# Patient Record
Sex: FEMALE | Race: WHITE | Employment: STUDENT | URBAN - METROPOLITAN AREA
[De-identification: names, ages, dates, MRNs, and addresses within clinical notes are randomized per-mention and may not be internally consistent; named-entity substitution may affect disease eponyms.]

---

## 2020-06-18 ENCOUNTER — VIRTUAL VISIT (OUTPATIENT)
Dept: PHYSICAL THERAPY | Facility: CLINIC | Age: 20
End: 2020-06-18
Payer: COMMERCIAL

## 2020-06-18 DIAGNOSIS — M25.562 LEFT KNEE PAIN, UNSPECIFIED CHRONICITY: ICD-10-CM

## 2020-06-18 PROCEDURE — 97110 THERAPEUTIC EXERCISES: CPT | Mod: GT | Performed by: PHYSICAL THERAPIST

## 2020-06-18 PROCEDURE — 97161 PT EVAL LOW COMPLEX 20 MIN: CPT | Mod: GT | Performed by: PHYSICAL THERAPIST

## 2020-06-18 NOTE — PROGRESS NOTES
"Physical Therapy Virtual Initial Visit      The patient has been notified of following:     \"This virtual visit will be conducted between you and your provider. We have found that certain health care needs can be provided without the need for physical presence.  This service lets us provide the care you need with a virtual visit.\"    Due to external, as well as internal [facility] management of the COVID-19 Virus, Trina Clay was not seen in our clinic.  As a substitution, we implemented a virtual visit to manage this patient's condition utilizing the [employer] virtual visit platform via the patient s existing code.  The provider, Leena Kaiser, reviewed the patient's chart, PTRx prescription, and spoke with the patient to determine the following telemedicine visit is appropriate and effective for the patient's care.    The following type of visit was completed:   Video Visit:  The [employer] platform uses a synchronous HIPAA compliant video stream for this patient encounter.         Subjective:  The history is provided by the patient. No  was used.   Patient Health History  Trina Clay being seen for left tibia fracture.     Problem began: 5/31/2020.   Problem occurred: from a fall.  Patient describes carrying one of her friends on her back when she started to fall forward.  To brace herself, she planted her left foot out in front of her and subsequently hyperextended her knee.  Patient has been wearing a hinged brace since her injury and remains NWB'ing on 2 crutches.  She has been advised by MD to remain NWB'ing for another 4 weeks .       General health as reported by patient is excellent.  Pertinent medical history includes: none.   Red flags:  None as reported by patient.  Medical allergies: none.   Surgeries include:  None.    Current medications:  None.    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                     Therapist Generated " HPI Evaluation         Type of problem:  Left knee.    This is a new condition.  Condition occurred with:  A fall/slip.    Patient reports pain:  Anterior and medial (proximal left tibia).        Associated symptoms:  Loss of motion/stiffness, edema and loss of strength. Symptoms are exacerbated by weight bearing  and relieved by bracing/immobilizing.          MD referral (patient has in her possession; advised to bring to next appt):  Evaluate and Rx 1-2x/week for 6 weeks with goals of increasing ROM, increasing strength, proprioception, and pain control.              Patient's Goals/Expectations:  To recover ROM and get stronger  Objective:    System                                              Knee Evaluation:  ROM:    AROM    Hyperextension: Left:  WNL    Right:  Extension: Left: WNL    Right:   Flexion: Left: Mild loss   Right:        Strength:         Quad Set Left:  Good    Pain: -           Edema:  Edema of the knee: mild effusion present L knee.          General   ROS    PTRx Content from today's visit:  Exercise Name: Supine Heel Slides - Reps: 20x - Sessions: 2-3  Exercise Name: Isometric Quad - Reps: 10x with 5-10 sec  hold (WITHOUT TOWEL ROLL under knee) - Sessions: 3  Exercise Name: Hip Flexion Straight Leg Raise, Sets: 3 - Reps: 10x - Sessions: 1  Exercise Name: Hip Extension Straight Leg Raise, Sets: 3 - Reps: 10x - Sessions: 1    Assessment/Plan:     Patient is a 19 year old female with left knee complaints.    Patient has the following significant findings with corresponding treatment plan.                Diagnosis 1:  Proximal L Tibia Fracture  Pain -  self management, education and home program  Decreased ROM/flexibility - therapeutic exercise  Decreased strength - therapeutic exercise and therapeutic activities  Edema - cold therapy and self management/home program  Impaired gait - gait training and assistive devices  Decreased function - therapeutic activities and home program    Cumulative  Therapy Evaluation is: Low complexity.    Previous and current functional limitations:  (See Goal Flow Sheet for this information)    Short term and Long term goals: (See Goal Flow Sheet for this information)     Communication ability:  Patient appears to be able to clearly communicate and understand verbal and written communication and follow directions correctly.  Treatment Explanation - The following has been discussed with the patient:  RX ordered/plan of care  Anticipated outcomes  Possible risks and side effects  This patient would benefit from PT intervention to resume normal activities.   Rehab potential is good.    Frequency:  1 X week, once daily  Duration:  for 12 weeks  Discharge Plan:  Achieve all LTG.  Independent in home treatment program.  Reach maximal therapeutic benefit.    Please refer to the daily flowsheet for treatment today, total treatment time and time spent performing 1:1 timed codes.       Virtual visit contact time    Time of service began: 10:40 AM  Time of service ended: 11:14 AM  Total Time for set up, visit, and documentation: 50 minutes    Payor: [employer] / Plan: [employer] / Product Type: HMO /     Procedure Code/s   Therapeutic Exercise (90408): 10 minutes    I have reviewed the note as documented above.  This accurately captures the substance of my conversation with the patient.  Provider location: [facility] [place] ([facility]/State)  Patient location: Home    ___________________________________________________

## 2020-06-18 NOTE — LETTER
"[facility]  [address]  [address]  [place] [address]  [phone]    2020    Re: Trina Clay   :   2000  MRN:  [id number]   REFERRING PHYSICIAN:   Jovani VUM [place] [facility]    Date of Initial Evaluation:  20  Visits:  Rxs Used: 1  Reason for Referral:  Left knee pain, unspecified chronicity    EVALUATION SUMMARY    Physical Therapy Virtual Initial Visit      The patient has been notified of following:     \"This virtual visit will be conducted between you and your provider. We have found that certain health care needs can be provided without the need for physical presence.  This service lets us provide the care you need with a virtual visit.\"    Due to external, as well as internal [facility] management of the COVID-19 Virus, Trina Clay was not seen in our clinic.  As a substitution, we implemented a virtual visit to manage this patient's condition utilizing the [employer] virtual visit platform via the patient s existing code.  The provider, Leena Kaiser, reviewed the patient's chart, PTRx prescription, and spoke with the patient to determine the following telemedicine visit is appropriate and effective for the patient's care.    The following type of visit was completed:   Video Visit:  The [employer] platform uses a synchronous HIPAA compliant video stream for this patient encounter.         Subjective:  The history is provided by the patient. No  was used.   Patient Health History  Trina Clay being seen for left tibia fracture.     Problem began: 2020.   Problem occurred: from a fall.  Patient describes carrying one of her friends on her back when she started to fall forward.  To brace herself, she planted her left foot out in front of her and subsequently hyperextended her knee.  Patient has been wearing a hinged brace since her injury and remains NWB'ing on 2 crutches.  She has been advised by MD to " remain NWB'ing for another 4 weeks .       [place] [facility]   :   2000        General health as reported by patient is excellent.  Pertinent medical history includes: none.   Red flags:  None as reported by patient.  Medical allergies: none.   Surgeries include:  None.    Current medications:  None.    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                     Therapist Generated HPI Evaluation         Type of problem:  Left knee.  This is a new condition.  Condition occurred with:  A fall/slip.  Patient reports pain:  Anterior and medial (proximal left tibia).  Associated symptoms:  Loss of motion/stiffness, edema and loss of strength. Symptoms are exacerbated by weight bearing  and relieved by bracing/immobilizing.  MD referral (patient has in her possession; advised to bring to next appt):  Evaluate and Rx 1-2x/week for 6 weeks with goals of increasing ROM, increasing strength, proprioception, and pain control.             Patient's Goals/Expectations:  To recover ROM and get stronger  Objective:  System       Knee Evaluation:  ROM:    AROM    Hyperextension: Left:  WNL    Right:  Extension: Left: WNL    Right:   Flexion: Left: Mild loss   Right:        Strength:         Quad Set Left:  Good    Pain: -     Edema:  Edema of the knee: mild effusion present L knee.          [facility]   :   2000          PTRx Content from today's visit:  Exercise Name: Supine Heel Slides - Reps: 20x - Sessions: 2-3  Exercise Name: Isometric Quad - Reps: 10x with 5-10 sec  hold (WITHOUT TOWEL ROLL under knee) - Sessions: 3  Exercise Name: Hip Flexion Straight Leg Raise, Sets: 3 - Reps: 10x - Sessions: 1  Exercise Name: Hip Extension Straight Leg Raise, Sets: 3 - Reps: 10x - Sessions: 1    Assessment/Plan:     Patient is a 19 year old female with left knee complaints.    Patient has the following significant findings with corresponding treatment plan.                 Diagnosis 1:  Proximal L Tibia Fracture  Pain -  self management, education and home program  Decreased ROM/flexibility - therapeutic exercise  Decreased strength - therapeutic exercise and therapeutic activities  Edema - cold therapy and self management/home program  Impaired gait - gait training and assistive devices  Decreased function - therapeutic activities and home program    Cumulative Therapy Evaluation is: Low complexity.    Previous and current functional limitations:  (See Goal Flow Sheet for this information)    Short term and Long term goals: (See Goal Flow Sheet for this information)     Communication ability:  Patient appears to be able to clearly communicate and understand verbal and written communication and follow directions correctly.  Treatment Explanation - The following has been discussed with the patient:  RX ordered/plan of care  Anticipated outcomes  Possible risks and side effects  This patient would benefit from PT intervention to resume normal activities.   Rehab potential is good.    Frequency:  1 X week, once daily  Duration:  for 12 weeks  Discharge Plan:  Achieve all LTG.  Independent in home treatment program.  Reach maximal therapeutic benefit.                      Trina Angelika Clay   :   2000      Virtual visit contact time    Time of service began: 10:40 AM  Time of service ended: 11:14 AM  Total Time for set up, visit, and documentation: 50 minutes    Payor: [employer] / Plan: [employer] [place] [employer] / Product Type: HMO /     Procedure Code/s   Therapeutic Exercise (04629): 10 minutes    I have reviewed the note as documented above.  This accurately captures the substance of my conversation with the patient.  Provider location: [facility] [place] ([facility]/State)  Patient location: Home    ___________________________________________________             Thank you for your referral.    INQUIRIES  Therapist: Leena Kaiser, PT, OCS  [facility]  [address]  [address]  [place] [facility] [place] [address]  Phone: [phone]  Fax: [phone]

## 2020-06-19 PROBLEM — M25.562 LEFT KNEE PAIN: Status: ACTIVE | Noted: 2020-06-19

## 2020-06-23 ENCOUNTER — THERAPY VISIT (OUTPATIENT)
Dept: PHYSICAL THERAPY | Facility: CLINIC | Age: 20
End: 2020-06-23
Payer: COMMERCIAL

## 2020-06-23 DIAGNOSIS — M25.562 LEFT KNEE PAIN, UNSPECIFIED CHRONICITY: Primary | ICD-10-CM

## 2020-06-23 PROCEDURE — 97110 THERAPEUTIC EXERCISES: CPT | Mod: GP | Performed by: PHYSICAL THERAPIST

## 2020-06-30 ENCOUNTER — THERAPY VISIT (OUTPATIENT)
Dept: PHYSICAL THERAPY | Facility: CLINIC | Age: 20
End: 2020-06-30
Payer: COMMERCIAL

## 2020-06-30 DIAGNOSIS — M25.562 LEFT KNEE PAIN, UNSPECIFIED CHRONICITY: ICD-10-CM

## 2020-06-30 PROCEDURE — 97112 NEUROMUSCULAR REEDUCATION: CPT | Mod: GP | Performed by: PHYSICAL THERAPIST

## 2020-06-30 PROCEDURE — 97110 THERAPEUTIC EXERCISES: CPT | Mod: GP | Performed by: PHYSICAL THERAPIST

## 2020-07-14 ENCOUNTER — THERAPY VISIT (OUTPATIENT)
Dept: PHYSICAL THERAPY | Facility: CLINIC | Age: 20
End: 2020-07-14
Payer: COMMERCIAL

## 2020-07-14 DIAGNOSIS — R26.9 ABNORMAL GAIT: ICD-10-CM

## 2020-07-14 DIAGNOSIS — M25.562 LEFT KNEE PAIN, UNSPECIFIED CHRONICITY: ICD-10-CM

## 2020-07-14 PROCEDURE — 97110 THERAPEUTIC EXERCISES: CPT | Mod: GP | Performed by: PHYSICAL THERAPIST

## 2020-07-14 PROCEDURE — 97116 GAIT TRAINING THERAPY: CPT | Mod: GP | Performed by: PHYSICAL THERAPIST

## 2020-07-28 ENCOUNTER — THERAPY VISIT (OUTPATIENT)
Dept: PHYSICAL THERAPY | Facility: CLINIC | Age: 20
End: 2020-07-28
Payer: COMMERCIAL

## 2020-07-28 DIAGNOSIS — R26.9 ABNORMAL GAIT: ICD-10-CM

## 2020-07-28 DIAGNOSIS — M25.562 LEFT KNEE PAIN, UNSPECIFIED CHRONICITY: ICD-10-CM

## 2020-07-28 PROCEDURE — 97110 THERAPEUTIC EXERCISES: CPT | Mod: GP | Performed by: PHYSICAL THERAPIST

## 2020-07-28 PROCEDURE — 97112 NEUROMUSCULAR REEDUCATION: CPT | Mod: GP | Performed by: PHYSICAL THERAPIST

## 2020-08-10 ENCOUNTER — THERAPY VISIT (OUTPATIENT)
Dept: PHYSICAL THERAPY | Facility: CLINIC | Age: 20
End: 2020-08-10
Payer: COMMERCIAL

## 2020-08-10 DIAGNOSIS — M25.562 LEFT KNEE PAIN, UNSPECIFIED CHRONICITY: ICD-10-CM

## 2020-08-10 DIAGNOSIS — R26.9 ABNORMAL GAIT: ICD-10-CM

## 2020-08-10 PROCEDURE — 97530 THERAPEUTIC ACTIVITIES: CPT | Mod: GP | Performed by: PHYSICAL THERAPIST

## 2020-08-10 PROCEDURE — 97112 NEUROMUSCULAR REEDUCATION: CPT | Mod: GP | Performed by: PHYSICAL THERAPIST

## 2020-11-09 PROBLEM — R26.9 ABNORMAL GAIT: Status: RESOLVED | Noted: 2020-07-14 | Resolved: 2020-11-09

## 2020-11-09 PROBLEM — M25.562 LEFT KNEE PAIN: Status: RESOLVED | Noted: 2020-06-19 | Resolved: 2020-11-09

## 2020-11-09 NOTE — PROGRESS NOTES
Subjective:  HPI  Physical Exam                    Objective:  System    Physical Exam    General     ROS    Assessment/Plan:    DISCHARGE REPORT    Progress reporting period is from 6/18/2020 to 8/10/2020.       SUBJECTIVE  At last scheduled visit, patient had no complaints of pain with ambulation or stairs, but noticed that her left knee wanted to buckle at times.  Primary limitation was that she could not do a deep squat.    Current Pain level: 0/10.     Previous pain level was  2/10  .   Changes in function:  Yes (See Goal flowsheet attached for changes in current functional level)  Adverse reaction to treatment or activity: None    OBJECTIVE  Left Knee AROM: WNL  Strength:  TrAbdominis 3/5.  Patient demonstrated good lumbopelvic stability with unilateral bridging.  Atrophy remained in left quad compared to uninvolved side   Proprioception: Fair with SLS when exposed to mild perturbations.   Gait: patient had progressed from NWB'ing on 2 crutches to ambulating independently in brace     ASSESSMENT/PLAN  STG/LTGs have been met or progress has been made towards goals:  Yes (See Goal flow sheet completed today.)  Assessment of Progress: Patient was meeting short term goals and progressing towards long term goals.  Self Management Plans:  Patient has been instructed in a home treatment program.  Trina continues to require the following intervention to meet STG and LTG's:  HEP    Recommendations:  Following last visit on 8/10/20, patient failed to schedule any additional appointments.  No further information on patient's status is known. Will consequently discharge from physical therapy.      Please refer to the daily flowsheet for treatment today, total treatment time and time spent performing 1:1 timed codes.